# Patient Record
(demographics unavailable — no encounter records)

---

## 2025-01-31 NOTE — DISCUSSION/SUMMARY
[de-identified] : I had a lengthy discussion with the patient regarding their current condition. We discussed the treatment options including operative and nonoperative management. At this time I recommended an MRI of his right elbow.  He has failed conservative management for several months.  We did discuss a second cortisone injection, PRP injection, and surgical intervention.  He will follow-up after the MRI results are available and he may wish to consider 1 more cortisone injection.  Ergonomic changes were discussed.  He will continue with bracing and anti-inflammatories as needed.

## 2025-01-31 NOTE — DISCUSSION/SUMMARY
[de-identified] : I had a lengthy discussion with the patient regarding their current condition. We discussed the treatment options including operative and nonoperative management. At this time I recommended an MRI of his right elbow.  He has failed conservative management for several months.  We did discuss a second cortisone injection, PRP injection, and surgical intervention.  He will follow-up after the MRI results are available and he may wish to consider 1 more cortisone injection.  Ergonomic changes were discussed.  He will continue with bracing and anti-inflammatories as needed.

## 2025-01-31 NOTE — HISTORY OF PRESENT ILLNESS
[de-identified] : The patient is a 59-year-old male here today for follow-up evaluation of his right medial epicondylitis.  He had cortisone injection in May of last year which gave him several months of relief.  His pain is slowly recurring.  He localizes medially.  He occasionally has numbness in his ulnar 2 fingers.  He is using a BandIT brace and Voltaren intermittently.

## 2025-01-31 NOTE — PHYSICAL EXAM
[de-identified] : Physical Exam:  General: Well appearing, no acute distress  Neurologic: A&Ox3, No focal deficits  Head: NCAT without abrasions, lacerations, or ecchymosis to head, face, or scalp  Eyes: No scleral icterus, no gross abnormalities  Respiratory: Equal chest wall expansion bilaterally, no accessory muscle use  Lymphatic: No lymphadenopathy palpated  Skin: Warm and dry  Psychiatric: Normal mood and affect  Right elbow shows no obvious deformity swelling or ecchymosis. There is tenderness to palpation over the medial epicondyle. No tenderness over the lateral epicondyle. Elbow ROM 0-120 with pain. Full ROM with pronation and supination with pain. Pain upon resisted elbow supination. No instability to valgus or vaurs stress in the elbow. Pain with resisted wrist extension with simultaneous elbow extension. Pos Tinel sign at the cubital tunnel and neg carpal tunnel. Compartments soft and nontender. Sensation intact distally. 2+ cap refill.

## 2025-01-31 NOTE — HISTORY OF PRESENT ILLNESS
[de-identified] : The patient is a 59-year-old male here today for follow-up evaluation of his right medial epicondylitis.  He had cortisone injection in May of last year which gave him several months of relief.  His pain is slowly recurring.  He localizes medially.  He occasionally has numbness in his ulnar 2 fingers.  He is using a BandIT brace and Voltaren intermittently.

## 2025-01-31 NOTE — PHYSICAL EXAM
[de-identified] : Physical Exam:  General: Well appearing, no acute distress  Neurologic: A&Ox3, No focal deficits  Head: NCAT without abrasions, lacerations, or ecchymosis to head, face, or scalp  Eyes: No scleral icterus, no gross abnormalities  Respiratory: Equal chest wall expansion bilaterally, no accessory muscle use  Lymphatic: No lymphadenopathy palpated  Skin: Warm and dry  Psychiatric: Normal mood and affect  Right elbow shows no obvious deformity swelling or ecchymosis. There is tenderness to palpation over the medial epicondyle. No tenderness over the lateral epicondyle. Elbow ROM 0-120 with pain. Full ROM with pronation and supination with pain. Pain upon resisted elbow supination. No instability to valgus or vaurs stress in the elbow. Pain with resisted wrist extension with simultaneous elbow extension. Pos Tinel sign at the cubital tunnel and neg carpal tunnel. Compartments soft and nontender. Sensation intact distally. 2+ cap refill.

## 2025-04-07 NOTE — PHYSICAL EXAM
[de-identified] : Physical Exam:  General: Well appearing, no acute distress  Neurologic: A&Ox3, No focal deficits  Head: NCAT without abrasions, lacerations, or ecchymosis to head, face, or scalp  Eyes: No scleral icterus, no gross abnormalities  Respiratory: Equal chest wall expansion bilaterally, no accessory muscle use  Lymphatic: No lymphadenopathy palpated  Skin: Warm and dry  Psychiatric: Normal mood and affect  Right elbow shows no obvious deformity swelling or ecchymosis. There is tenderness to palpation over the medial epicondyle. No tenderness over the lateral epicondyle. Elbow ROM 0-120 with pain. Full ROM with pronation and supination with pain. Pain upon resisted elbow supination. No instability to valgus or vaurs stress in the elbow. Pain with resisted wrist extension with simultaneous elbow extension. Pos Tinel sign at the cubital tunnel and neg carpal tunnel. Compartments soft and nontender. Sensation intact distally. 2+ cap refill. [de-identified] : EXAM: [id number] - MR ELBOW RT  - ORDERED BY:  JENNIFER PETERSON PROCEDURE DATE:  02/10/2025  IMPRESSION: 1.  Partial tearing of the common flexor tendon origin with background tendinosis. 2.  Edema within the flexor digitorum superficialis muscle adjacent to the common flexor tendon origin, reflective of mild strain.

## 2025-04-07 NOTE — HISTORY OF PRESENT ILLNESS
[de-identified] : The patient is a 59-year-old male here today for follow-up evaluation of his right medial epicondylitis.  He had cortisone injection in May 2024 which gave him several months of relief.  His pain is slowly recurring.  He localizes medially.  He occasionally has numbness in his ulnar 2 fingers.  He is using a BandIT brace and Voltaren intermittently. Patient had a MRI of his R elbow for review from [facility].

## 2025-04-07 NOTE — PROCEDURE
[de-identified] :  Injection: US guided Right elbow. Indication: Medial epicondylitis.   A discussion was had with the patient regarding this procedure and all questions were answered. All risks, benefits and alternatives were discussed. These included but were not limited to bleeding, infection, and allergic reaction. Alcohol was used to clean the skin, and ChloraPrep was used to sterilize and prep the area in the medial aspect of the elbow. The elbow was placed in slight flexion. Ethyl chloride spray was then used as a topical anesthetic. A 22-gauge needle was used to inject 1 cc of 0.25% bupivacaine, 1cc of 1% xylocaine and 1cc of 40mg/mL triamcinolone acetonide into the flexor tendon origin. A yessica needling procedure was performed. Ultrasound guidance was utilized for localization of the needle as well as to identify the ulnar nerve which was situated within the groove. A sterile band aid was then applied. The patient tolerated the procedure well and there were no complications. Post injection instructions were given.

## 2025-04-07 NOTE — PHYSICAL EXAM
[de-identified] : Physical Exam:  General: Well appearing, no acute distress  Neurologic: A&Ox3, No focal deficits  Head: NCAT without abrasions, lacerations, or ecchymosis to head, face, or scalp  Eyes: No scleral icterus, no gross abnormalities  Respiratory: Equal chest wall expansion bilaterally, no accessory muscle use  Lymphatic: No lymphadenopathy palpated  Skin: Warm and dry  Psychiatric: Normal mood and affect  Right elbow shows no obvious deformity swelling or ecchymosis. There is tenderness to palpation over the medial epicondyle. No tenderness over the lateral epicondyle. Elbow ROM 0-120 with pain. Full ROM with pronation and supination with pain. Pain upon resisted elbow supination. No instability to valgus or vaurs stress in the elbow. Pain with resisted wrist extension with simultaneous elbow extension. Pos Tinel sign at the cubital tunnel and neg carpal tunnel. Compartments soft and nontender. Sensation intact distally. 2+ cap refill. [de-identified] : EXAM: [id number] - MR ELBOW RT  - ORDERED BY:  JENNIFER PETERSON PROCEDURE DATE:  02/10/2025  IMPRESSION: 1.  Partial tearing of the common flexor tendon origin with background tendinosis. 2.  Edema within the flexor digitorum superficialis muscle adjacent to the common flexor tendon origin, reflective of mild strain.

## 2025-04-07 NOTE — PROCEDURE
[de-identified] :  Injection: US guided Right elbow. Indication: Medial epicondylitis.   A discussion was had with the patient regarding this procedure and all questions were answered. All risks, benefits and alternatives were discussed. These included but were not limited to bleeding, infection, and allergic reaction. Alcohol was used to clean the skin, and ChloraPrep was used to sterilize and prep the area in the medial aspect of the elbow. The elbow was placed in slight flexion. Ethyl chloride spray was then used as a topical anesthetic. A 22-gauge needle was used to inject 1 cc of 0.25% bupivacaine, 1cc of 1% xylocaine and 1cc of 40mg/mL triamcinolone acetonide into the flexor tendon origin. A yessica needling procedure was performed. Ultrasound guidance was utilized for localization of the needle as well as to identify the ulnar nerve which was situated within the groove. A sterile band aid was then applied. The patient tolerated the procedure well and there were no complications. Post injection instructions were given.

## 2025-04-07 NOTE — DISCUSSION/SUMMARY
[de-identified] : I had a lengthy discussion with the patient regarding their current condition. We discussed the treatment options including operative and nonoperative management. Pt due to his acute pain elected for a corticosteroid injection at today's visit and tolerated the procedure well. He should take it easy for the next 2-3 days while icing the joint. All questions were answered and the patient verbalized understanding. The patient is in agreement with this treatment plan. Patient will follow up for repeat clinical assessment.

## 2025-04-07 NOTE — HISTORY OF PRESENT ILLNESS
[de-identified] : The patient is a 59-year-old male here today for follow-up evaluation of his right medial epicondylitis.  He had cortisone injection in May 2024 which gave him several months of relief.  His pain is slowly recurring.  He localizes medially.  He occasionally has numbness in his ulnar 2 fingers.  He is using a BandIT brace and Voltaren intermittently. Patient had a MRI of his R elbow for review from [facility].

## 2025-04-07 NOTE — DISCUSSION/SUMMARY
[de-identified] : I had a lengthy discussion with the patient regarding their current condition. We discussed the treatment options including operative and nonoperative management. Pt due to his acute pain elected for a corticosteroid injection at today's visit and tolerated the procedure well. He should take it easy for the next 2-3 days while icing the joint. All questions were answered and the patient verbalized understanding. The patient is in agreement with this treatment plan. Patient will follow up for repeat clinical assessment.

## 2025-04-07 NOTE — ADDENDUM
[FreeTextEntry1] : Documented by Charlene Eisenberg acting as a scribe for Dr. Phan on 04/07/2025. All medical record entries made by the Scribe were at my, Dr. Phan's, direction and personally dictated by me on 04/07/2025. I have reviewed the chart and agree that the record accurately reflects my personal performance of the history, physical exam, procedure and imaging.